# Patient Record
Sex: FEMALE | Race: WHITE | NOT HISPANIC OR LATINO | URBAN - METROPOLITAN AREA
[De-identification: names, ages, dates, MRNs, and addresses within clinical notes are randomized per-mention and may not be internally consistent; named-entity substitution may affect disease eponyms.]

---
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
## 2024-02-12 ENCOUNTER — OFFICE VISIT (OUTPATIENT)
Dept: URGENT CARE | Facility: CLINIC | Age: 24
End: 2024-02-12
Payer: COMMERCIAL

## 2024-02-12 VITALS
SYSTOLIC BLOOD PRESSURE: 128 MMHG | BODY MASS INDEX: 27.12 KG/M2 | RESPIRATION RATE: 18 BRPM | HEIGHT: 65 IN | WEIGHT: 162.8 LBS | TEMPERATURE: 99.3 F | HEART RATE: 116 BPM | OXYGEN SATURATION: 97 % | DIASTOLIC BLOOD PRESSURE: 77 MMHG

## 2024-02-12 DIAGNOSIS — J06.9 VIRAL URI: Primary | ICD-10-CM

## 2024-02-12 DIAGNOSIS — Z20.818 EXPOSURE TO STREP THROAT: ICD-10-CM

## 2024-02-12 LAB — S PYO AG THROAT QL: NEGATIVE

## 2024-02-12 PROCEDURE — 99213 OFFICE O/P EST LOW 20 MIN: CPT | Performed by: PHYSICIAN ASSISTANT

## 2024-02-12 PROCEDURE — 87880 STREP A ASSAY W/OPTIC: CPT | Performed by: PHYSICIAN ASSISTANT

## 2024-02-12 NOTE — LETTER
February 12, 2024     Patient: Luz Bolden   YOB: 2000   Date of Visit: 2/12/2024       To Whom it May Concern:    Luz Bolden was seen in my clinic on 2/12/2024. She may return to school on 2/13/2024 .           Sincerely,          Jus Muñoz PA-C

## 2024-02-12 NOTE — PROGRESS NOTES
[facility]        NAME: Luz Bolden is a 23 y.o. female  : 2000    MRN: [id number]  DATE: 2024  TIME: 11:30 AM    Assessment and Plan   Viral URI [J06.9]  1. Viral URI        2. Exposure to strep throat  POCT rapid ANTIGEN strepA            Patient Instructions   Drink plenty of fluids.  May use over the counter cold medications for symptomatic treatment. Do not use medications with Pseudoephedrine or Phenylphrine if you have high blood pressure because it may worsen your blood pressure. Follow up with your PCP in 3-5 days if your symptoms do not improve or if you have any concerns. Go to the ER if symptoms become severe.      Follow up with PCP in 3-5 days.  Proceed to  ER if symptoms worsen.    Chief Complaint     Chief Complaint   Patient presents with    Cold Like Symptoms     Cough, body aches and sore throat since last night. Sister positive for strep          History of Present Illness       She is a 23-year-old female with no significant past medical history presents the office complaining of bodyaches, sore throat, and mild cough since last night.  Sister was diagnosed with strep.        Review of Systems   Review of Systems   Constitutional:  Negative for chills and fever.   HENT:  Positive for sore throat. Negative for congestion.    Respiratory:  Positive for cough. Negative for shortness of breath.    Cardiovascular:  Negative for chest pain and palpitations.   Gastrointestinal:  Negative for abdominal pain, diarrhea, nausea and vomiting.   Musculoskeletal:  Positive for myalgias.   Skin:  Negative for rash.   Neurological:  Negative for dizziness, light-headedness and headaches.         Current Medications       Current Outpatient Medications:     neomycin-polymyxin-hydrocortisone (CORTISPORIN) 0.35%-10,000 units/mL-1% otic suspension, Administer 4 drops into the left ear 4 (four) times a day for 7 days, Disp: 10 mL, Rfl: 0    Current Allergies     Allergies as of  "02/12/2024    (No Known Allergies)            The following portions of the patient's history were reviewed and updated as appropriate: allergies, current medications, past family history, past medical history, past social history, past surgical history and problem list.     Past Medical History:   Diagnosis Date    Known health problems: none        Past Surgical History:   Procedure Laterality Date    WISDOM TOOTH EXTRACTION         History reviewed. No pertinent family history.      Medications have been verified.        Objective   /77   Pulse (!) 116   Temp 99.3 °F (37.4 °C) (Temporal)   Resp 18   Ht 5' 5\" (1.651 m)   Wt 73.8 kg (162 lb 12.8 oz)   SpO2 97%   BMI 27.09 kg/m²   No LMP recorded.       Physical Exam     Physical Exam  Vitals and nursing note reviewed.   Constitutional:       Appearance: Normal appearance. She is well-developed.   HENT:      Head: Normocephalic and atraumatic.      Right Ear: Tympanic membrane, ear canal and external ear normal.      Left Ear: Tympanic membrane, ear canal and external ear normal.      Nose: Nose normal.      Mouth/Throat:      Pharynx: Uvula midline.   Eyes:      General: Lids are normal.      Conjunctiva/sclera: Conjunctivae normal.      Pupils: Pupils are equal, round, and reactive to light.   Cardiovascular:      Rate and Rhythm: Regular rhythm. Tachycardia present.      Pulses: Normal pulses.      Heart sounds: Normal heart sounds. No murmur heard.     No friction rub. No gallop.   Pulmonary:      Effort: Pulmonary effort is normal.      Breath sounds: Normal breath sounds. No wheezing, rhonchi or rales.   Musculoskeletal:         General: Normal range of motion.      Cervical back: Neck supple.   Lymphadenopathy:      Cervical: No cervical adenopathy.   Skin:     General: Skin is warm and dry.      Capillary Refill: Capillary refill takes less than 2 seconds.   Neurological:      Mental Status: She is alert.       POC rapid strep " negative

## 2024-05-01 ENCOUNTER — OFFICE VISIT (OUTPATIENT)
Dept: URGENT CARE | Facility: CLINIC | Age: 24
End: 2024-05-01
Payer: COMMERCIAL

## 2024-05-01 VITALS
RESPIRATION RATE: 18 BRPM | TEMPERATURE: 96.8 F | WEIGHT: 163.4 LBS | SYSTOLIC BLOOD PRESSURE: 100 MMHG | OXYGEN SATURATION: 98 % | HEART RATE: 104 BPM | DIASTOLIC BLOOD PRESSURE: 58 MMHG | BODY MASS INDEX: 27.22 KG/M2 | HEIGHT: 65 IN

## 2024-05-01 DIAGNOSIS — B34.9 VIRAL SYNDROME: Primary | ICD-10-CM

## 2024-05-01 LAB — S PYO AG THROAT QL: NEGATIVE

## 2024-05-01 PROCEDURE — 99213 OFFICE O/P EST LOW 20 MIN: CPT | Performed by: PHYSICIAN ASSISTANT

## 2024-05-01 PROCEDURE — 87880 STREP A ASSAY W/OPTIC: CPT | Performed by: PHYSICIAN ASSISTANT

## 2024-05-01 NOTE — LETTER
May 1, 2024     Patient: Luz Bolden   YOB: 2000   Date of Visit: 5/1/2024       To Whom it May Concern:    Luz Bolden was seen in my clinic on 5/1/2024. She may return to school on 5/2/2024 .           Sincerely,          Jus Muñoz PA-C

## 2024-05-01 NOTE — PATIENT INSTRUCTIONS
Maintain hydration by drinking lots of fluids.  If feeling nauseous, take small steps every 5-10 minutes.  May supplement water with Pedialyte or watered down Gatorade.  Eat as tolerated.  Begin with clear liquids (Jell-O, water ice, broth, popsicles) and progress to BRAT diet (bananas, rice, applesauce and toast).  Advance diet as tolerated.  Use OTC Tylenol for fever.  You should begin to feel better and the next couple days.    Follow-up with your PCP in 3-5 days.  Go to the ER if you are unable to maintain hydration or symptoms become severe.

## 2024-05-01 NOTE — PROGRESS NOTES
[facility]        NAME: Luz Bolden is a 24 y.o. female  : 2000    MRN: [id number]  DATE: May 1, 2024  TIME: 8:55 AM    Assessment and Plan   Viral syndrome [B34.9]  1. Viral syndrome  POCT rapid ANTIGEN strepA            Patient Instructions   Over-the-counter cold and flu medications as needed for symptoms.  Drink plenty of fluids.  Follow-up with the PCP in 3 to 5 days if her symptoms do not improve.  Go to ER if symptoms become severe.    Over-the-counter cold medication 101:  -Guanfacine (ex. Mucinex) is a mucus thinner.  Good for sinus or chest congestion.  Works best if you drink plenty of fluids.  -Dextromethorphan (ex. Delsym, Mucinex DM) as a cough suppressant  -Pseudoephedrine (ex. Sudaphed) is a decongestion and should not be used by those with high blood pressure or heart problems.  -Flonase is a intranasal steroid.  Good for sinus congestion and postnasal drip.  -Antihistamines (Claritin, Zyrtec, Allegra Benadryl) are used for allergies as well as colds for treatment of congestion and ear fullness.    Maintain hydration by drinking lots of fluids.  If feeling nauseous, take small steps every 5-10 minutes.  May supplement water with Pedialyte or watered down Gatorade.  Eat as tolerated.  Begin with clear liquids (Jell-O, water ice, broth, popsicles) and progress to BRAT diet (bananas, rice, applesauce and toast).  Advance diet as tolerated.  Use OTC Tylenol for fever.  You should begin to feel better and the next couple days.    Follow-up with your PCP in 3-5 days.  Go to the ER if you are unable to maintain hydration or symptoms become severe.      Follow up with PCP in 3-5 days.  Proceed to  ER if symptoms worsen.    If tests have been performed at [facility], our office will contact you with results if changes need to be made to the care plan discussed with you at the visit.  [facility]    Chief Complaint     Chief Complaint   Patient  presents with    Cold Like Symptoms     Vomiting, body aches and throat pain x 1 day          History of Present Illness       Patient is a 24-year-old female with no significant past medical history presents the office complaining of bodyaches, nausea, vomiting, and sore throat for 1 day.    Vomiting   This is a new problem. The current episode started yesterday. The problem has been gradually improving. There has been no fever. Associated symptoms include abdominal pain, dizziness, headaches and myalgias. Pertinent negatives include no coughing, diarrhea, fever or URI. Associated symptoms comments: +sore throat. Treatments tried: mucinex. The treatment provided moderate relief.       Review of Systems   Review of Systems   Constitutional:  Negative for fever.   HENT:  Positive for congestion and sore throat.    Respiratory:  Negative for cough and shortness of breath.    Cardiovascular:  Negative for palpitations.   Gastrointestinal:  Positive for abdominal pain, nausea and vomiting. Negative for diarrhea.   Musculoskeletal:  Positive for myalgias.   Skin:  Negative for rash.   Neurological:  Positive for dizziness and headaches.         Current Medications       Current Outpatient Medications:     neomycin-polymyxin-hydrocortisone (CORTISPORIN) 0.35%-10,000 units/mL-1% otic suspension, Administer 4 drops into the left ear 4 (four) times a day for 7 days (Patient not taking: Reported on 5/1/2024), Disp: 10 mL, Rfl: 0    Current Allergies     Allergies as of 05/01/2024    (No Known Allergies)            The following portions of the patient's history were reviewed and updated as appropriate: allergies, current medications, past family history, past medical history, past social history, past surgical history and problem list.     Past Medical History:   Diagnosis Date    Known health problems: none        Past Surgical History:   Procedure Laterality Date    REDUCTION MAMMAPLASTY      WISDOM TOOTH EXTRACTION    "      History reviewed. No pertinent family history.      Medications have been verified.        Objective   /58   Pulse 104   Temp (!) 96.8 °F (36 °C) (Tympanic)   Resp 18   Ht 5' 5\" (1.651 m)   Wt 74.1 kg (163 lb 6.4 oz)   SpO2 98%   BMI 27.19 kg/m²   No LMP recorded.       Physical Exam     Physical Exam  Vitals and nursing note reviewed.   Constitutional:       Appearance: Normal appearance. She is well-developed.   HENT:      Head: Normocephalic and atraumatic.      Right Ear: Tympanic membrane, ear canal and external ear normal.      Left Ear: Tympanic membrane, ear canal and external ear normal.      Nose: Congestion present.      Mouth/Throat:      Pharynx: Uvula midline. Posterior oropharyngeal erythema present. No pharyngeal swelling.      Tonsils: No tonsillar exudate or tonsillar abscesses.   Eyes:      General: Lids are normal.      Conjunctiva/sclera: Conjunctivae normal.      Pupils: Pupils are equal, round, and reactive to light.   Cardiovascular:      Rate and Rhythm: Normal rate and regular rhythm.      Pulses: Normal pulses.      Heart sounds: Normal heart sounds. No murmur heard.     No friction rub. No gallop.   Pulmonary:      Effort: Pulmonary effort is normal.      Breath sounds: Normal breath sounds. No wheezing, rhonchi or rales.   Abdominal:      General: Bowel sounds are normal.      Palpations: Abdomen is soft.      Tenderness: There is no abdominal tenderness.   Musculoskeletal:         General: Normal range of motion.      Cervical back: Neck supple.   Lymphadenopathy:      Cervical: No cervical adenopathy.   Skin:     General: Skin is warm and dry.      Capillary Refill: Capillary refill takes less than 2 seconds.   Neurological:      Mental Status: She is alert.         POC rapid strep negative          "